# Patient Record
Sex: FEMALE | Race: WHITE | NOT HISPANIC OR LATINO | Employment: FULL TIME | URBAN - METROPOLITAN AREA
[De-identification: names, ages, dates, MRNs, and addresses within clinical notes are randomized per-mention and may not be internally consistent; named-entity substitution may affect disease eponyms.]

---

## 2017-12-18 ENCOUNTER — HOSPITAL ENCOUNTER (EMERGENCY)
Facility: HOSPITAL | Age: 22
Discharge: HOME | End: 2017-12-18
Attending: EMERGENCY MEDICINE
Payer: COMMERCIAL

## 2017-12-18 VITALS
DIASTOLIC BLOOD PRESSURE: 58 MMHG | HEIGHT: 68 IN | SYSTOLIC BLOOD PRESSURE: 107 MMHG | TEMPERATURE: 99 F | HEART RATE: 74 BPM | BODY MASS INDEX: 32.58 KG/M2 | OXYGEN SATURATION: 99 % | WEIGHT: 215 LBS | RESPIRATION RATE: 18 BRPM

## 2017-12-18 DIAGNOSIS — R51.9 ACUTE NONINTRACTABLE HEADACHE, UNSPECIFIED HEADACHE TYPE: Primary | ICD-10-CM

## 2017-12-18 PROCEDURE — 99284 EMERGENCY DEPT VISIT MOD MDM: CPT | Mod: 25

## 2017-12-18 PROCEDURE — 25000003 PHARM REV CODE 250: Performed by: EMERGENCY MEDICINE

## 2017-12-18 PROCEDURE — 96372 THER/PROPH/DIAG INJ SC/IM: CPT

## 2017-12-18 PROCEDURE — 63600175 PHARM REV CODE 636 W HCPCS: Performed by: EMERGENCY MEDICINE

## 2017-12-18 RX ORDER — BUTALBITAL, ACETAMINOPHEN AND CAFFEINE 50; 325; 40 MG/1; MG/1; MG/1
1 TABLET ORAL EVERY 4 HOURS PRN
Qty: 15 TABLET | Refills: 0 | Status: SHIPPED | OUTPATIENT
Start: 2017-12-18 | End: 2018-01-17

## 2017-12-18 RX ORDER — BUTALBITAL, ACETAMINOPHEN AND CAFFEINE 50; 325; 40 MG/1; MG/1; MG/1
1 TABLET ORAL
Status: COMPLETED | OUTPATIENT
Start: 2017-12-18 | End: 2017-12-18

## 2017-12-18 RX ORDER — KETOROLAC TROMETHAMINE 30 MG/ML
60 INJECTION, SOLUTION INTRAMUSCULAR; INTRAVENOUS
Status: COMPLETED | OUTPATIENT
Start: 2017-12-18 | End: 2017-12-18

## 2017-12-18 RX ORDER — METHYLPHENIDATE HYDROCHLORIDE 54 MG/1
54 TABLET ORAL EVERY MORNING
COMMUNITY

## 2017-12-18 RX ADMIN — KETOROLAC TROMETHAMINE 60 MG: 30 INJECTION, SOLUTION INTRAMUSCULAR at 09:12

## 2017-12-18 RX ADMIN — BUTALBITAL, ACETAMINOPHEN, AND CAFFEINE 1 TABLET: 50; 325; 40 TABLET ORAL at 09:12

## 2017-12-18 NOTE — ED PROVIDER NOTES
Encounter Date: 12/18/2017       History     Chief Complaint   Patient presents with    Headache     headache for 1-2 hrs constant and severe.     The history is provided by the patient.   Headache    This is a recurrent problem. The current episode started today. The problem occurs constantly. The problem has been unchanged. The pain is located in the frontal region. The pain does not radiate. The pain quality is similar to prior headaches. The quality of the pain is described as aching and boring. Pertinent negatives include no back pain, fever, nausea, sore throat or weakness. Nothing aggravates the symptoms. She has tried nothing for the symptoms. Her past medical history is significant for migraine headaches.     Review of patient's allergies indicates:  No Known Allergies  Past Medical History:   Diagnosis Date    ADD (attention deficit disorder)     Migraine headache      Past Surgical History:   Procedure Laterality Date    SHOULDER SURGERY      left    TONSILLECTOMY       History reviewed. No pertinent family history.  Social History   Substance Use Topics    Smoking status: Never Smoker    Smokeless tobacco: Never Used    Alcohol use Not on file     Review of Systems   Constitutional: Negative for fever.   HENT: Negative for sore throat.    Respiratory: Negative for shortness of breath.    Cardiovascular: Negative for chest pain.   Gastrointestinal: Negative for nausea.   Genitourinary: Negative for dysuria.   Musculoskeletal: Negative for back pain.   Skin: Negative for rash.   Neurological: Positive for headaches. Negative for weakness.   Hematological: Does not bruise/bleed easily.       Physical Exam     Initial Vitals [12/18/17 0921]   BP Pulse Resp Temp SpO2   121/61 74 20 98.8 °F (37.1 °C) 98 %      MAP       81         Physical Exam    Nursing note and vitals reviewed.  Constitutional: She appears well-developed and well-nourished. No distress.   HENT:   Head: Normocephalic and atraumatic.  "  Mouth/Throat: Oropharynx is clear and moist.   Eyes: Conjunctivae and EOM are normal. Pupils are equal, round, and reactive to light.   Neck: Normal range of motion. Neck supple.   Cardiovascular: Normal rate, regular rhythm and normal heart sounds. Exam reveals no gallop and no friction rub.    No murmur heard.  Pulmonary/Chest: Breath sounds normal. No respiratory distress. She has no wheezes. She has no rhonchi. She has no rales.   Abdominal: Soft. Bowel sounds are normal. She exhibits no distension and no mass. There is no tenderness. There is no rebound and no guarding.   Musculoskeletal: Normal range of motion. She exhibits no edema or tenderness.   Neurological: She is alert and oriented to person, place, and time. She has normal strength.   Skin: Skin is warm and dry. No rash noted.   Psychiatric: She has a normal mood and affect. Thought content normal.         ED Course   Procedures  Labs Reviewed - No data to display           ED Vital Signs:  Vitals:    12/18/17 0921 12/18/17 1015   BP: 121/61 (!) 107/58   Pulse: 74 74   Resp: 20 18   Temp: 98.8 °F (37.1 °C)    TempSrc: Oral    SpO2: 98% 99%   Weight: 97.5 kg (215 lb)    Height: 5' 8" (1.727 m)          Abnormal Lab Results:  Labs Reviewed - No data to display       All Lab Results:        Imaging Results:  Imaging Results          CT Head Without Contrast (Final result)  Result time 12/18/17 10:04:19    Final result by Jorge Becerra MD (12/18/17 10:04:19)                 Impression:         Negative noncontrast CT scan of the brain.         All CT scans at this facility use dose modulation, iterative reconstruction, and/or weight based dosing when appropriate to reduce radiation dose to as low as reasonably achievable.       Electronically signed by: JORGE BECERRA MD  Date:     12/18/17  Time:    10:04              Narrative:    CT HEAD WITHOUT CONTRAST    Date: 12/18/17 09:43:28    Clinical indication:  headache     Technique:  Standard " noncontrast CT scan of the brain. No previous for comparison.     Findings:  The ventricles are nondilated. Gray and white matter structures reveal normal attenuation. No hemorrhage, mass effect or edema is identified.     The cranium is intact.  Visualized paranasal sinuses and mastoid air cells are clear.                                 The Emergency Provider reviewed the vital signs and test results, which are outlined above.    ED Discussions:  10:18 AM: Reassessed pt at this time.  Pt states her condition has improved at this time. States that her headache has completely resolved. Discussed with pt all pertinent ED information and results. Discussed pt dx of headache and plan of tx. Gave pt all f/u and return to the ED instructions. All questions and concerns were addressed at this time. Pt expresses understanding of information and instructions, and is comfortable with plan to discharge. Pt is stable for discharge.                              ED Course      Clinical Impression:       ICD-10-CM ICD-9-CM   1. Acute nonintractable headache, unspecified headache type R51 784.0         Disposition:   Disposition: Discharged  Condition: Stable                        Gonzalo Rosales MD  12/18/17 1020

## 2017-12-18 NOTE — ED NOTES
"Pt lying quietly in bed.  Family members noted at bedside.  Pt c/o "migraine headache" x 1.5 hours.  Pt reports this headache is worse than others in the past.  + nausea; denies vomiting; denies vision changes denies fever.  Pt took tylenol and motrin PTA with no relief.  Pt is AAOx3.    Cardiac: s1s2 auscultated; no murmur noted  Pulmonary: BBS -clear; no wheezing noted    "